# Patient Record
Sex: FEMALE | Race: WHITE | NOT HISPANIC OR LATINO | Employment: OTHER | URBAN - METROPOLITAN AREA
[De-identification: names, ages, dates, MRNs, and addresses within clinical notes are randomized per-mention and may not be internally consistent; named-entity substitution may affect disease eponyms.]

---

## 2017-07-20 ENCOUNTER — PREPPED CHART (OUTPATIENT)
Dept: URBAN - METROPOLITAN AREA CLINIC 43 | Facility: CLINIC | Age: 72
End: 2017-07-20

## 2018-02-15 ENCOUNTER — ESTABLISHED COMPREHENSIVE EXAM (OUTPATIENT)
Dept: URBAN - METROPOLITAN AREA CLINIC 43 | Facility: CLINIC | Age: 73
End: 2018-02-15

## 2018-02-15 DIAGNOSIS — H40.013: ICD-10-CM

## 2018-02-15 DIAGNOSIS — H04.123: ICD-10-CM

## 2018-02-15 DIAGNOSIS — H25.813: ICD-10-CM

## 2018-02-15 PROCEDURE — G8756 NO BP MEASURE DOC: HCPCS

## 2018-02-15 PROCEDURE — 1036F TOBACCO NON-USER: CPT

## 2018-02-15 PROCEDURE — 92014 COMPRE OPH EXAM EST PT 1/>: CPT

## 2018-02-15 PROCEDURE — G8427 DOCREV CUR MEDS BY ELIG CLIN: HCPCS

## 2018-02-15 PROCEDURE — 92133 CPTRZD OPH DX IMG PST SGM ON: CPT

## 2018-02-15 ASSESSMENT — TONOMETRY
OS_IOP_MMHG: 14
OD_IOP_MMHG: 13

## 2018-02-15 ASSESSMENT — VISUAL ACUITY
OS_PH: 20/25-2
OD_SC: J4
OD_SC: 20/25+1
OS_SC: J1
OS_SC: 20/100

## 2018-07-31 NOTE — PATIENT DISCUSSION
Recommend retinal eval by retinal specialist due to monocular status and questionable history of RD OD.

## 2019-03-26 ENCOUNTER — ESTABLISHED COMPREHENSIVE EXAM (OUTPATIENT)
Dept: URBAN - METROPOLITAN AREA CLINIC 43 | Facility: CLINIC | Age: 74
End: 2019-03-26

## 2019-03-26 DIAGNOSIS — H25.813: ICD-10-CM

## 2019-03-26 DIAGNOSIS — H40.013: ICD-10-CM

## 2019-03-26 PROCEDURE — 9222650 BILAT EXTENDED OPHTHALMOSCOPY, F/U

## 2019-03-26 PROCEDURE — 92014 COMPRE OPH EXAM EST PT 1/>: CPT

## 2019-03-26 PROCEDURE — 92015 DETERMINE REFRACTIVE STATE: CPT

## 2019-03-26 PROCEDURE — 92250 FUNDUS PHOTOGRAPHY W/I&R: CPT

## 2019-03-26 ASSESSMENT — VISUAL ACUITY
OS_BAT: 20/50 WITH MR
OD_SC: 20/40
OD_SC: J3
OS_SC: J1
OS_PH: 20/40-2
OD_PH: 20/30-2
OS_SC: 20/200
OD_BAT: 20/60 WITH MR

## 2019-03-26 ASSESSMENT — TONOMETRY
OS_IOP_MMHG: 14
OD_IOP_MMHG: 14

## 2022-06-29 ENCOUNTER — ESTABLISHED PATIENT (OUTPATIENT)
Dept: URBAN - METROPOLITAN AREA CLINIC 43 | Facility: CLINIC | Age: 77
End: 2022-06-29

## 2022-06-29 DIAGNOSIS — H40.013: ICD-10-CM

## 2022-06-29 DIAGNOSIS — H43.813: ICD-10-CM

## 2022-06-29 DIAGNOSIS — H35.3112: ICD-10-CM

## 2022-06-29 DIAGNOSIS — H35.3121: ICD-10-CM

## 2022-06-29 PROCEDURE — 92014 COMPRE OPH EXAM EST PT 1/>: CPT

## 2022-06-29 PROCEDURE — 92134 CPTRZ OPH DX IMG PST SGM RTA: CPT

## 2022-06-29 ASSESSMENT — VISUAL ACUITY
OD_SC: 20/200
OS_CC: J4
OS_CC: 20/30+2
OD_CC: J4
OU_SC: J3
OD_CC: 20/30+2
OS_SC: J3
OS_SC: 20/200
OD_SC: J3
OU_CC: J3
OU_CC: 20/25

## 2022-06-29 ASSESSMENT — TONOMETRY
OD_IOP_MMHG: 11
OS_IOP_MMHG: 11

## 2022-12-14 ENCOUNTER — COMPREHENSIVE EXAM (OUTPATIENT)
Dept: URBAN - METROPOLITAN AREA CLINIC 43 | Facility: CLINIC | Age: 77
End: 2022-12-14

## 2022-12-14 PROCEDURE — 92015 DETERMINE REFRACTIVE STATE: CPT

## 2022-12-14 PROCEDURE — 92014 COMPRE OPH EXAM EST PT 1/>: CPT

## 2022-12-14 ASSESSMENT — VISUAL ACUITY
OS_SC: 20/60-2
OD_CC: J2
OD_SC: J1
OD_CC: 20/25-2
OS_SC: J1
OS_CC: J1
OS_CC: 20/20-2
OD_SC: 20/200

## 2022-12-14 ASSESSMENT — TONOMETRY
OS_IOP_MMHG: 13
OD_IOP_MMHG: 12

## 2023-07-05 ENCOUNTER — EMERGENCY VISIT (OUTPATIENT)
Dept: URBAN - METROPOLITAN AREA CLINIC 43 | Facility: CLINIC | Age: 78
End: 2023-07-05

## 2023-07-05 DIAGNOSIS — H43.813: ICD-10-CM

## 2023-07-05 DIAGNOSIS — H35.3121: ICD-10-CM

## 2023-07-05 DIAGNOSIS — H35.3112: ICD-10-CM

## 2023-07-05 PROCEDURE — 92012 INTRM OPH EXAM EST PATIENT: CPT

## 2023-07-05 ASSESSMENT — VISUAL ACUITY
OD_CC: 20/30
OS_CC: 20/25

## 2023-07-05 ASSESSMENT — TONOMETRY
OS_IOP_MMHG: 12
OD_IOP_MMHG: 12

## 2023-10-09 ENCOUNTER — COMPREHENSIVE EXAM (OUTPATIENT)
Dept: URBAN - METROPOLITAN AREA CLINIC 43 | Facility: CLINIC | Age: 78
End: 2023-10-09

## 2023-10-09 DIAGNOSIS — H04.123: ICD-10-CM

## 2023-10-09 DIAGNOSIS — H52.13: ICD-10-CM

## 2023-10-09 DIAGNOSIS — H35.3121: ICD-10-CM

## 2023-10-09 DIAGNOSIS — H35.3112: ICD-10-CM

## 2023-10-09 DIAGNOSIS — H43.813: ICD-10-CM

## 2023-10-09 PROCEDURE — 92134 CPTRZ OPH DX IMG PST SGM RTA: CPT

## 2023-10-09 PROCEDURE — 92015 DETERMINE REFRACTIVE STATE: CPT

## 2023-10-09 PROCEDURE — 92014 COMPRE OPH EXAM EST PT 1/>: CPT | Mod: 25

## 2023-10-09 ASSESSMENT — VISUAL ACUITY
OS_SC: 20/60
OD_CC: 20/25+1
OS_CC: J2
OD_CC: J2
OS_SC: J2
OS_CC: 20/20-1
OD_SC: 20/100
OD_SC: J1

## 2023-10-09 ASSESSMENT — TONOMETRY
OD_IOP_MMHG: 12
OS_IOP_MMHG: 12

## 2024-06-11 ENCOUNTER — COMPREHENSIVE EXAM (OUTPATIENT)
Dept: URBAN - METROPOLITAN AREA CLINIC 43 | Facility: CLINIC | Age: 79
End: 2024-06-11

## 2024-06-11 DIAGNOSIS — H43.813: ICD-10-CM

## 2024-06-11 DIAGNOSIS — H04.123: ICD-10-CM

## 2024-06-11 DIAGNOSIS — H35.3112: ICD-10-CM

## 2024-06-11 DIAGNOSIS — H35.3121: ICD-10-CM

## 2024-06-11 DIAGNOSIS — H52.13: ICD-10-CM

## 2024-06-11 PROCEDURE — 92014 COMPRE OPH EXAM EST PT 1/>: CPT

## 2024-06-11 PROCEDURE — 92015 DETERMINE REFRACTIVE STATE: CPT

## 2024-06-11 ASSESSMENT — VISUAL ACUITY
OS_CC: 20/20-1
OD_CC: 20/20-2
OD_SC: J1-
OD_SC: 20/100-1
OS_CC: J1
OS_SC: J2
OS_SC: 20/80+2
OD_CC: J2-

## 2024-06-11 ASSESSMENT — TONOMETRY
OS_IOP_MMHG: 10
OD_IOP_MMHG: 11

## 2025-06-11 ENCOUNTER — COMPREHENSIVE EXAM (OUTPATIENT)
Age: 80
End: 2025-06-11

## 2025-06-11 DIAGNOSIS — H04.123: ICD-10-CM

## 2025-06-11 DIAGNOSIS — H35.3112: ICD-10-CM

## 2025-06-11 DIAGNOSIS — H52.13: ICD-10-CM

## 2025-06-11 DIAGNOSIS — H35.3121: ICD-10-CM

## 2025-06-11 DIAGNOSIS — H43.813: ICD-10-CM

## 2025-06-11 PROCEDURE — 92015 DETERMINE REFRACTIVE STATE: CPT

## 2025-06-11 PROCEDURE — 92014 COMPRE OPH EXAM EST PT 1/>: CPT | Mod: 25

## 2025-06-11 PROCEDURE — 92134 CPTRZ OPH DX IMG PST SGM RTA: CPT
